# Patient Record
Sex: MALE | Race: WHITE
[De-identification: names, ages, dates, MRNs, and addresses within clinical notes are randomized per-mention and may not be internally consistent; named-entity substitution may affect disease eponyms.]

---

## 2019-05-23 PROBLEM — Z00.00 ENCOUNTER FOR PREVENTIVE HEALTH EXAMINATION: Status: ACTIVE | Noted: 2019-05-23

## 2019-09-03 ENCOUNTER — APPOINTMENT (OUTPATIENT)
Dept: GASTROENTEROLOGY | Facility: CLINIC | Age: 49
End: 2019-09-03
Payer: COMMERCIAL

## 2019-09-03 VITALS
DIASTOLIC BLOOD PRESSURE: 92 MMHG | HEART RATE: 55 BPM | HEIGHT: 70 IN | SYSTOLIC BLOOD PRESSURE: 148 MMHG | WEIGHT: 210 LBS | BODY MASS INDEX: 30.06 KG/M2

## 2019-09-03 DIAGNOSIS — Z82.49 FAMILY HISTORY OF ISCHEMIC HEART DISEASE AND OTHER DISEASES OF THE CIRCULATORY SYSTEM: ICD-10-CM

## 2019-09-03 DIAGNOSIS — Z78.9 OTHER SPECIFIED HEALTH STATUS: ICD-10-CM

## 2019-09-03 DIAGNOSIS — R55 SYNCOPE AND COLLAPSE: ICD-10-CM

## 2019-09-03 DIAGNOSIS — Z82.5 FAMILY HISTORY OF ASTHMA AND OTHER CHRONIC LOWER RESPIRATORY DISEASES: ICD-10-CM

## 2019-09-03 DIAGNOSIS — Z87.891 PERSONAL HISTORY OF NICOTINE DEPENDENCE: ICD-10-CM

## 2019-09-03 PROCEDURE — 36415 COLL VENOUS BLD VENIPUNCTURE: CPT

## 2019-09-03 PROCEDURE — 82274 ASSAY TEST FOR BLOOD FECAL: CPT | Mod: QW

## 2019-09-03 PROCEDURE — 99244 OFF/OP CNSLTJ NEW/EST MOD 40: CPT | Mod: 25

## 2019-09-03 NOTE — PHYSICAL EXAM
[General Appearance - Alert] : alert [General Appearance - In No Acute Distress] : in no acute distress [General Appearance - Well Nourished] : well nourished [General Appearance - Well Developed] : well developed [General Appearance - Well-Appearing] : healthy appearing [Sclera] : the sclera and conjunctiva were normal [PERRL With Normal Accommodation] : pupils were equal in size, round, and reactive to light [Extraocular Movements] : extraocular movements were intact [Strabismus] : no strabismus was seen [Optic Disc Abnormality] : the optic disc were normal in size and color [Retina] : no retinal hemorrhages, vessel changes or exudates seen on fundoscopic exam [Outer Ear] : the ears and nose were normal in appearance [Examination Of The Oral Cavity] : the lips and gums were normal [Both Tympanic Membranes Were Examined] : both tympanic membranes were normal [Nasal Cavity] : the nasal mucosa and septum were normal [Oropharynx] : the oropharynx was normal [Neck Appearance] : the appearance of the neck was normal [Neck Cervical Mass (___cm)] : no neck mass was observed [Jugular Venous Distention Increased] : there was no jugular-venous distention [Thyroid Diffuse Enlargement] : the thyroid was not enlarged [Thyroid Nodule] : there were no palpable thyroid nodules [Auscultation Breath Sounds / Voice Sounds] : lungs were clear to auscultation bilaterally [Lungs Percussion] : the lungs were normal to percussion [Heart Rate And Rhythm] : heart rate was normal and rhythm regular [Heart Sounds] : normal S1 and S2 [Heart Sounds Gallop] : no gallops [Heart Sounds Pericardial Friction Rub] : no pericardial rub [Murmurs] : no murmurs [Arterial Pulses Carotid] : carotid pulses were normal with no bruits [Abdominal Aorta] : the abdominal aorta was normal [Full Pulse] : the pedal pulses are present [Edema] : there was no peripheral edema [Abdomen Soft] : soft [Bowel Sounds] : normal bowel sounds [Abdomen Tenderness] : non-tender [Abdomen Mass (___ Cm)] : no abdominal mass palpated [Abdomen Hernia] : no hernia was discovered [Normal Sphincter Tone] : normal sphincter tone [No Rectal Mass] : no rectal mass [Scrotum] : the scrotum was normal [Penis Abnormality] : the penis was normal [Testes Swelling] : the testicles were not swollen [Prostate Enlargement] : the prostate was not enlarged [Testes Mass (___cm)] : there were no testicular masses [Prostate Tenderness] : the prostate was not tender [Cervical Lymph Nodes Enlarged Posterior Bilaterally] : posterior cervical [Cervical Lymph Nodes Enlarged Anterior Bilaterally] : anterior cervical [Supraclavicular Lymph Nodes Enlarged Bilaterally] : supraclavicular [Axillary Lymph Nodes Enlarged Bilaterally] : axillary [Inguinal Lymph Nodes Enlarged Bilaterally] : inguinal [Femoral Lymph Nodes Enlarged Bilaterally] : femoral [No CVA Tenderness] : no ~M costovertebral angle tenderness [No Spinal Tenderness] : no spinal tenderness [Abnormal Walk] : normal gait [Nail Clubbing] : no clubbing  or cyanosis of the fingernails [Musculoskeletal - Swelling] : no joint swelling seen [Motor Tone] : muscle strength and tone were normal [Involuntary Movements] : no involuntary movements were seen [Skin Color & Pigmentation] : normal skin color and pigmentation [Skin Turgor] : normal skin turgor [Skin Lesions] : no skin lesions [] : no rash [No Focal Deficits] : no focal deficits [Oriented To Time, Place, And Person] : oriented to person, place, and time [Affect] : the affect was normal [Impaired Insight] : insight and judgment were intact [Mood] : the mood was normal [Occult Blood Positive] : stool was negative for occult blood [FreeTextEntry1] : Brown stool, Hemoccult negative, iFOBT negative

## 2019-09-03 NOTE — HISTORY OF PRESENT ILLNESS
[FreeTextEntry1] : This 48-year-old gentleman is referred for an evaluation prior to scheduling a follow-up colonoscopy. I performed 3 colonoscopies on him in the past, the first one was about 18 years ago, at which time he had colonic polyps. The subsequent 2 colonoscopies were negative for polyps. He has a history of irritable bowel syndrome, and occasionally having 10 bowel movements a day. This happens rarely, and usually has multiple bowel movements in the morning. The first bowel movement is formed and subsequent bowel movements are less formed. He denies blood in the stool. He does complain of borborygmi. He was diagnosed with irritable bowel syndrome. When he bends forward to tie his shoelaces, he feels like he is going to pass out. There is no family history of colon polyps or colon cancer. He had chest pain and some shortness of breath. He was evaluated by a cardiologist, Dr. Gary and a coronary angiogram revealed some blockages not requiring stenting. He has hypertension, for which she takes atenolol 25 mg and hypercholesterolemia. He was on rosuvastatin in the past, but developed muscle aches and joint pains causing him to stop the medication. He retired from the police department in 2019, and since then, he has not been complaining of shortness of breath or chest pain. His mother  secondary to heart disease following heart valve surgery. His father  secondary to emphysema. One of his 2 brothers has emphysema and his other brother and sister are healthy. He is , and his son and daughter are healthy. He smoked when younger, but stopped smoking many years ago. He drinks alcohol frequently, especially on weekends. He denies becoming intoxicated. No drug allergies are known.

## 2019-09-03 NOTE — REASON FOR VISIT
[Consultation] : a consultation visit [FreeTextEntry1] : Pre- Colonoscopy exam.  Pt's last CO was about 10 years ago, which showed polyps and dx of IBS.  He intermittent c/o gurgling/growling in stomach.

## 2019-09-03 NOTE — CONSULT LETTER
[Consult Letter:] : I had the pleasure of evaluating your patient, [unfilled]. [Dear  ___] : Dear  [unfilled], [( Thank you for referring [unfilled] for consultation for _____ )] : Thank you for referring [unfilled] for consultation for [unfilled] [Please see my note below.] : Please see my note below. [Consult Closing:] : Thank you very much for allowing me to participate in the care of this patient.  If you have any questions, please do not hesitate to contact me. [Sincerely,] : Sincerely, [FreeTextEntry3] : Natalio Arevalo MD

## 2019-09-03 NOTE — ASSESSMENT
[FreeTextEntry1] : 1. History of colonic polyps-rule out metachronous polyps.\par 2. Hypertension.\par 3. Hypercholesterolemia.\par 4. Coronary artery disease.\par 5. Irritable bowel syndrome, diarrheal variant.\par 6. Suspect vasovagal symptoms.\par \par Plan:\par 1. The patient was advised to schedule a colonoscopy-procedure, material risks, benefits and alternatives were discussed with the patient at length. Brochure given.  MiraLax prep.\par 2. Routine bloods drawn.\par 3. The patient was advised to speak to his PCP and/or cardiologist regarding the suspected vasovagal symptoms.

## 2019-09-03 NOTE — REVIEW OF SYSTEMS
[Hoarseness] : hoarseness [Shortness Of Breath] : shortness of breath [Joint Pain] : joint pain [Joint Stiffness] : joint stiffness [Limb Pain] : limb pain [Dry Skin] : dry skin [Muscle Weakness] : muscle weakness [Easy Bruising] : a tendency for easy bruising [Negative] : Heme/Lymph

## 2019-09-04 ENCOUNTER — LABORATORY RESULT (OUTPATIENT)
Age: 49
End: 2019-09-04

## 2019-09-04 LAB
25(OH)D3 SERPL-MCNC: 33.8 NG/ML
ALBUMIN SERPL ELPH-MCNC: 4.9 G/DL
ALP BLD-CCNC: 83 U/L
ALT SERPL-CCNC: 27 U/L
ANION GAP SERPL CALC-SCNC: 14 MMOL/L
AST SERPL-CCNC: 19 U/L
BASOPHILS # BLD AUTO: 0.04 K/UL
BASOPHILS NFR BLD AUTO: 0.6 %
BILIRUB DIRECT SERPL-MCNC: 0.2 MG/DL
BILIRUB SERPL-MCNC: 0.7 MG/DL
BUN SERPL-MCNC: 18 MG/DL
CALCIUM SERPL-MCNC: 10.3 MG/DL
CHLORIDE SERPL-SCNC: 105 MMOL/L
CHOLEST SERPL-MCNC: 196 MG/DL
CHOLEST/HDLC SERPL: 4.4 RATIO
CO2 SERPL-SCNC: 25 MMOL/L
CREAT SERPL-MCNC: 0.94 MG/DL
EOSINOPHIL # BLD AUTO: 0.15 K/UL
EOSINOPHIL NFR BLD AUTO: 2.3 %
ESTIMATED AVERAGE GLUCOSE: 100 MG/DL
FERRITIN SERPL-MCNC: 496 NG/ML
GGT SERPL-CCNC: 16 U/L
GLUCOSE SERPL-MCNC: 100 MG/DL
HBA1C MFR BLD HPLC: 5.1 %
HCT VFR BLD CALC: 51.5 %
HDLC SERPL-MCNC: 45 MG/DL
HGB BLD-MCNC: 16.3 G/DL
IMM GRANULOCYTES NFR BLD AUTO: 0.6 %
IRON SATN MFR SERPL: 54 %
IRON SERPL-MCNC: 171 UG/DL
LDLC SERPL CALC-MCNC: 127 MG/DL
LYMPHOCYTES # BLD AUTO: 2.12 K/UL
LYMPHOCYTES NFR BLD AUTO: 32.5 %
MAGNESIUM SERPL-MCNC: 2.2 MG/DL
MAN DIFF?: NORMAL
MCHC RBC-ENTMCNC: 30.7 PG
MCHC RBC-ENTMCNC: 31.7 GM/DL
MCV RBC AUTO: 97 FL
MONOCYTES # BLD AUTO: 0.51 K/UL
MONOCYTES NFR BLD AUTO: 7.8 %
NEUTROPHILS # BLD AUTO: 3.67 K/UL
NEUTROPHILS NFR BLD AUTO: 56.2 %
PHOSPHATE SERPL-MCNC: 3.7 MG/DL
PLATELET # BLD AUTO: 216 K/UL
POTASSIUM SERPL-SCNC: 5.7 MMOL/L
PROT SERPL-MCNC: 6.8 G/DL
PSA SERPL-MCNC: 1.12 NG/ML
RBC # BLD: 5.31 M/UL
RBC # FLD: 13.3 %
SODIUM SERPL-SCNC: 144 MMOL/L
TIBC SERPL-MCNC: 315 UG/DL
TRIGL SERPL-MCNC: 121 MG/DL
UIBC SERPL-MCNC: 144 UG/DL
WBC # FLD AUTO: 6.53 K/UL

## 2019-09-24 ENCOUNTER — APPOINTMENT (OUTPATIENT)
Dept: GASTROENTEROLOGY | Facility: CLINIC | Age: 49
End: 2019-09-24
Payer: COMMERCIAL

## 2019-09-24 PROCEDURE — 45378 DIAGNOSTIC COLONOSCOPY: CPT

## 2021-08-10 ENCOUNTER — NON-APPOINTMENT (OUTPATIENT)
Age: 51
End: 2021-08-10

## 2021-08-11 ENCOUNTER — APPOINTMENT (OUTPATIENT)
Dept: ORTHOPEDIC SURGERY | Facility: CLINIC | Age: 51
End: 2021-08-11
Payer: COMMERCIAL

## 2021-08-11 ENCOUNTER — NON-APPOINTMENT (OUTPATIENT)
Age: 51
End: 2021-08-11

## 2021-08-11 PROCEDURE — 99203 OFFICE O/P NEW LOW 30 MIN: CPT | Mod: 25

## 2021-08-11 PROCEDURE — 20550 NJX 1 TENDON SHEATH/LIGAMENT: CPT | Mod: F8

## 2021-11-01 ENCOUNTER — APPOINTMENT (OUTPATIENT)
Dept: ORTHOPEDIC SURGERY | Facility: CLINIC | Age: 51
End: 2021-11-01
Payer: COMMERCIAL

## 2021-11-01 VITALS
BODY MASS INDEX: 30.78 KG/M2 | SYSTOLIC BLOOD PRESSURE: 151 MMHG | DIASTOLIC BLOOD PRESSURE: 94 MMHG | WEIGHT: 215 LBS | HEART RATE: 76 BPM | HEIGHT: 70 IN

## 2021-11-01 DIAGNOSIS — Z78.9 OTHER SPECIFIED HEALTH STATUS: ICD-10-CM

## 2021-11-01 PROCEDURE — 99214 OFFICE O/P EST MOD 30 MIN: CPT | Mod: 25

## 2021-11-01 PROCEDURE — 20550 NJX 1 TENDON SHEATH/LIGAMENT: CPT | Mod: F8

## 2021-11-01 NOTE — DISCUSSION/SUMMARY
[FreeTextEntry1] : The patient had 2-1/2 months relief after prior cortisone injection right ring trigger finger.\par Left ring trigger finger has not been symptomatic.\par The statistical chances of resolution versus recurrence, and the statistics regarding the possibility of additional injections  were discussed with the because patient had 8 months of triggering prior to first injection the statistical chance of resolution with today's injection is lower than it would be if duration of symptoms were less than 2 to 3 months.\par Left ring trigger finger is not notably symptomatic.\par Patient treated with cortisone injection in 2 phases for right ring trigger finger recurrence.\par No other active hand problem identified requiring treatment.\par The following post-injection instructions were given to the patient: The patient should be cautious in activities for two weeks and then increase to full activities.  Thereafter, the patient should return if symptoms continue, or if they guillermina and recur subsequently. If symptoms do not recur, the patient need not return and can be seen on an as needed basis. The patient has expressed understanding and acceptance of analysis, treatment, and recommendations.  All questions answered.\par

## 2021-11-01 NOTE — PROCEDURE
[] : The injection was done in 2 phases with the first phase being subcutaneous injection of lidocaine 1.5 cc subcutaneously as anesthetic. When adequate level of anesthesia was achieved, the Celestone injection was undertaken. [4th] : 4th finger

## 2021-11-01 NOTE — PHYSICAL EXAM
[de-identified] : Right hand:\par Ring finger A1 pulley tender \par Full composite fist right hand, ring finger locks and triggers into extension.\par There is no other A1 pulley tenderness or triggering in any other finger, right hand.\par No pertinent MP, PIP, or DIP joint contributory findings.\par \par Left hand\par Ring finger A1 pulley slightly tender\par Subtle nodularity in flexor tendon but no provocable triggering or active triggering.\par No active or provokable triggering\par There is no other A1 pulley tenderness or triggering in any other finger, left hand.\par \par Neurologic: Median, ulnar, and radial motor and sensory are intact.\par  Phalen's test with median nerve compression: negative. \par Skin: No cyanosis, clubbing, edema or rashes.\par Vascular: Radial pulses intact.\par Lymphatic: No streaking or epitrochlear adenopathy.\par The patient is awake, alert, and oriented. Affect appropriate. Cooperative.

## 2022-02-28 ENCOUNTER — NON-APPOINTMENT (OUTPATIENT)
Age: 52
End: 2022-02-28

## 2022-02-28 DIAGNOSIS — R19.7 DIARRHEA, UNSPECIFIED: ICD-10-CM

## 2022-03-01 ENCOUNTER — NON-APPOINTMENT (OUTPATIENT)
Age: 52
End: 2022-03-01

## 2022-03-08 ENCOUNTER — APPOINTMENT (OUTPATIENT)
Dept: GASTROENTEROLOGY | Facility: CLINIC | Age: 52
End: 2022-03-08
Payer: COMMERCIAL

## 2022-03-08 VITALS
HEART RATE: 72 BPM | HEIGHT: 70 IN | DIASTOLIC BLOOD PRESSURE: 87 MMHG | WEIGHT: 208 LBS | SYSTOLIC BLOOD PRESSURE: 121 MMHG | BODY MASS INDEX: 29.78 KG/M2

## 2022-03-08 DIAGNOSIS — Z12.11 ENCOUNTER FOR SCREENING FOR MALIGNANT NEOPLASM OF COLON: ICD-10-CM

## 2022-03-08 DIAGNOSIS — R93.5 ABNORMAL FINDINGS ON DIAGNOSTIC IMAGING OF OTHER ABDOMINAL REGIONS, INCLUDING RETROPERITONEUM: ICD-10-CM

## 2022-03-08 DIAGNOSIS — I10 ESSENTIAL (PRIMARY) HYPERTENSION: ICD-10-CM

## 2022-03-08 DIAGNOSIS — K44.9 DIAPHRAGMATIC HERNIA W/OUT OBSTRUCTION OR GANGRENE: ICD-10-CM

## 2022-03-08 DIAGNOSIS — Z87.19 PERSONAL HISTORY OF OTHER DISEASES OF THE DIGESTIVE SYSTEM: ICD-10-CM

## 2022-03-08 DIAGNOSIS — K58.0 IRRITABLE BOWEL SYNDROME WITH DIARRHEA: ICD-10-CM

## 2022-03-08 DIAGNOSIS — E78.00 PURE HYPERCHOLESTEROLEMIA, UNSPECIFIED: ICD-10-CM

## 2022-03-08 DIAGNOSIS — I25.10 ATHEROSCLEROTIC HEART DISEASE OF NATIVE CORONARY ARTERY W/OUT ANGINA PECTORIS: ICD-10-CM

## 2022-03-08 DIAGNOSIS — Z86.010 PERSONAL HISTORY OF COLONIC POLYPS: ICD-10-CM

## 2022-03-08 PROCEDURE — 99214 OFFICE O/P EST MOD 30 MIN: CPT

## 2022-03-08 PROCEDURE — 82274 ASSAY TEST FOR BLOOD FECAL: CPT | Mod: QW

## 2022-03-08 RX ORDER — HYOSCYAMINE SULFATE 0.12 MG/1
0.12 TABLET SUBLINGUAL 3 TIMES DAILY
Qty: 90 | Refills: 3 | Status: ACTIVE | COMMUNITY
Start: 2022-03-08 | End: 1900-01-01

## 2022-03-08 RX ORDER — ASPIRIN 81 MG/1
81 TABLET, DELAYED RELEASE ORAL DAILY
Refills: 0 | Status: ACTIVE | COMMUNITY
Start: 2022-03-08

## 2022-03-08 RX ORDER — ATENOLOL 25 MG/1
25 TABLET ORAL DAILY
Refills: 0 | Status: ACTIVE | COMMUNITY

## 2022-03-08 NOTE — PHYSICAL EXAM
[General Appearance - Alert] : alert [General Appearance - In No Acute Distress] : in no acute distress [General Appearance - Well Nourished] : well nourished [General Appearance - Well Developed] : well developed [General Appearance - Well-Appearing] : healthy appearing [Sclera] : the sclera and conjunctiva were normal [PERRL With Normal Accommodation] : pupils were equal in size, round, and reactive to light [Extraocular Movements] : extraocular movements were intact [Strabismus] : no strabismus was seen [Optic Disc Abnormality] : the optic disc were normal in size and color [Retina] : no retinal hemorrhages, vessel changes or exudates seen on fundoscopic exam [Outer Ear] : the ears and nose were normal in appearance [Examination Of The Oral Cavity] : the lips and gums were normal [Both Tympanic Membranes Were Examined] : both tympanic membranes were normal [Oropharynx] : the oropharynx was normal [Neck Appearance] : the appearance of the neck was normal [Neck Cervical Mass (___cm)] : no neck mass was observed [Jugular Venous Distention Increased] : there was no jugular-venous distention [Thyroid Diffuse Enlargement] : the thyroid was not enlarged [Thyroid Nodule] : there were no palpable thyroid nodules [Auscultation Breath Sounds / Voice Sounds] : lungs were clear to auscultation bilaterally [Lungs Percussion] : the lungs were normal to percussion [Heart Rate And Rhythm] : heart rate was normal and rhythm regular [Heart Sounds] : normal S1 and S2 [Heart Sounds Gallop] : no gallops [Murmurs] : no murmurs [Heart Sounds Pericardial Friction Rub] : no pericardial rub [Arterial Pulses Carotid] : carotid pulses were normal with no bruits [Abdominal Aorta] : the abdominal aorta was normal [Full Pulse] : the pedal pulses are present [Edema] : there was no peripheral edema [Veins - Varicosity Changes] : there were no varicosital changes [Bowel Sounds] : normal bowel sounds [Abdomen Soft] : soft [Abdomen Tenderness] : non-tender [Abdomen Mass (___ Cm)] : no abdominal mass palpated [Abdomen Hernia] : no hernia was discovered [Normal Sphincter Tone] : normal sphincter tone [No Rectal Mass] : no rectal mass [Occult Blood Positive] : stool was negative for occult blood [Penis Abnormality] : the penis was normal [Scrotum] : the scrotum was normal [Testes Swelling] : the testicles were not swollen [Testes Mass (___cm)] : there were no testicular masses [Prostate Enlargement] : the prostate was not enlarged [Prostate Tenderness] : the prostate was not tender [Cervical Lymph Nodes Enlarged Posterior Bilaterally] : posterior cervical [Cervical Lymph Nodes Enlarged Anterior Bilaterally] : anterior cervical [Supraclavicular Lymph Nodes Enlarged Bilaterally] : supraclavicular [Femoral Lymph Nodes Enlarged Bilaterally] : femoral [Inguinal Lymph Nodes Enlarged Bilaterally] : inguinal [No CVA Tenderness] : no ~M costovertebral angle tenderness [No Spinal Tenderness] : no spinal tenderness [Abnormal Walk] : normal gait [Nail Clubbing] : no clubbing  or cyanosis of the fingernails [Involuntary Movements] : no involuntary movements were seen [Musculoskeletal - Swelling] : no joint swelling seen [Motor Tone] : muscle strength and tone were normal [Skin Color & Pigmentation] : normal skin color and pigmentation [Skin Turgor] : normal skin turgor [] : no rash [Skin Lesions] : no skin lesions [No Focal Deficits] : no focal deficits [Oriented To Time, Place, And Person] : oriented to person, place, and time [Impaired Insight] : insight and judgment were intact [Affect] : the affect was normal [Mood] : the mood was normal [FreeTextEntry1] : Anxious

## 2022-03-08 NOTE — HISTORY OF PRESENT ILLNESS
[FreeTextEntry1] : Colten was very sick about a week ago, at which time he had severe epigastric pain, nausea, vomiting, diarrhea and fever.  He went to [facility], where a CT scan showed evidence for a diffuse colitis, and he was suspected as having infectious colitis.  He was treated with levofloxacin and ondansetron.  After discharge, he returned to the hospital with continued pain, and a repeat CT scan suggested the presence of an apple core lesion in the transverse colon.  Colonoscopy in 2019 only revealed diverticulosis and hemorrhoids.  Both CT scans were done without oral contrast.  The patient feels much better now, and his diarrhea has abated, and he no longer has fever, nausea or vomiting.  The second CT scan revealed a hiatal hernia with a knuckle of transverse colon and trapped in the hernia, and the patient saw Dr. Mookie Ferrara, who wanted to perform a hiatal hernia repair; however, a more pressing issue is to determine if there is a colon cancer present in the transverse colon, as suggested by the second CT scan.  The patient remains to be very anxious, and he is requesting a prescription for hyoscyamine sublingual 0.125 mg should he develop diarrhea again.

## 2022-03-08 NOTE — CONSULT LETTER
[Dear  ___] : Dear  [unfilled], [Consult Letter:] : I had the pleasure of evaluating your patient, [unfilled]. [( Thank you for referring [unfilled] for consultation for _____ )] : Thank you for referring [unfilled] for consultation for [unfilled] [Please see my note below.] : Please see my note below. [Consult Closing:] : Thank you very much for allowing me to participate in the care of this patient.  If you have any questions, please do not hesitate to contact me. [Sincerely,] : Sincerely, [FreeTextEntry3] : Natalio Arevalo MD

## 2022-03-08 NOTE — ASSESSMENT
[FreeTextEntry1] : Assessment:\par 1. History of colonic polyps and abnormal CT scan suggestive of an apple core lesion in the transverse colon-colonoscopy September 24, 2019 revealed a few diverticula in the descending colon and internal hemorrhoids-rule out colon lesion/polyp.\par 2. Hypertension.\par 3. Hypercholesterolemia.\par 4. Coronary artery disease.\par 5. Irritable bowel syndrome, diarrheal variant-status post recent gastroenteritis.\par 6. Suspect vasovagal symptoms.\par 7. Hiatal hernia with knuckle of unobstructed transverse colon trapped within it.\par \par Plan:\par 1.  The patient was advised to schedule a colonoscopy.  The procedure, material risks (including bleeding, perforation, anesthesia-related complications, rare complications, death and risk of missing a lesion), benefits (including finding a polyp, cancer, inflammatory bowel disease or other lesions) and alternatives (including stool testing and imaging) were discussed with the patient at length.  The [facility] Brochure was given. The MiraLax preparation was advised.\par 2.  The patient was advised to schedule an upper endoscopy.  The procedure, material risks (including bleeding, perforation, anesthesia-related complications, rare complications, death and risk of missing a lesion), benefits (including finding an ulcer, esophagitis,  infection, celiac disease, cancer or other lesions) and alternatives (including imaging and stool testing) were discussed with the patient at length.  The [facility] Brochure was given.\par 3.  Extensive review of patient's medical records from [facility] was performed.

## 2022-03-08 NOTE — REVIEW OF SYSTEMS
[Feeling Poorly] : feeling poorly [Recent Weight Loss (___ Lbs)] : recent [unfilled] ~Ulb weight loss [Abdominal Pain] : abdominal pain [Vomiting] : vomiting [Melena] : meleliseo [Negative] : Heme/Lymph

## 2022-03-09 ENCOUNTER — NON-APPOINTMENT (OUTPATIENT)
Age: 52
End: 2022-03-09

## 2022-03-10 LAB — SARS-COV-2 N GENE NPH QL NAA+PROBE: NOT DETECTED

## 2022-03-13 ENCOUNTER — LABORATORY RESULT (OUTPATIENT)
Age: 52
End: 2022-03-13

## 2022-03-14 ENCOUNTER — APPOINTMENT (OUTPATIENT)
Dept: GASTROENTEROLOGY | Facility: CLINIC | Age: 52
End: 2022-03-14
Payer: COMMERCIAL

## 2022-03-14 DIAGNOSIS — K29.90 GASTRODUODENITIS, UNSPECIFIED, W/OUT BLEEDING: ICD-10-CM

## 2022-03-14 PROCEDURE — 45380 COLONOSCOPY AND BIOPSY: CPT

## 2022-03-14 PROCEDURE — 43239 EGD BIOPSY SINGLE/MULTIPLE: CPT | Mod: 59

## 2022-03-14 RX ORDER — OMEPRAZOLE 20 MG/1
20 CAPSULE, DELAYED RELEASE ORAL
Qty: 90 | Refills: 3 | Status: ACTIVE | COMMUNITY
Start: 2022-03-14 | End: 1900-01-01

## 2022-03-17 ENCOUNTER — NON-APPOINTMENT (OUTPATIENT)
Age: 52
End: 2022-03-17

## 2022-05-24 ENCOUNTER — NON-APPOINTMENT (OUTPATIENT)
Age: 52
End: 2022-05-24

## 2022-08-29 ENCOUNTER — NON-APPOINTMENT (OUTPATIENT)
Age: 52
End: 2022-08-29

## 2022-09-01 ENCOUNTER — APPOINTMENT (OUTPATIENT)
Dept: ORTHOPEDIC SURGERY | Facility: CLINIC | Age: 52
End: 2022-09-01

## 2022-09-01 PROCEDURE — 20550 NJX 1 TENDON SHEATH/LIGAMENT: CPT | Mod: RT

## 2022-09-01 PROCEDURE — 73130 X-RAY EXAM OF HAND: CPT | Mod: RT

## 2022-09-01 PROCEDURE — 99214 OFFICE O/P EST MOD 30 MIN: CPT | Mod: 25

## 2022-09-01 NOTE — END OF VISIT
[FreeTextEntry3] : All medical record entries made by the Scribe were at my,  Dr. Vin Gallardo MD., direction and personally dictated by me on 09/01/2022. I have personally reviewed the chart and agree that the record accurately reflects my personal performance of the history, physical exam, assessment and plan.

## 2022-09-01 NOTE — PHYSICAL EXAM
[de-identified] : Patient is WDWN, alert, and in no acute distress. Breathing is unlabored. He is grossly oriented to person, place, and time.\par \par Right Hand:\par There is A1 pulley tenderness in the ring finger. Full arc of motion in the fingers, and all intrinsic and extrinsic hand muscles 5/5. No joint instability on provocative testing, sensation is intact to light touch, and no skin lesions or discoloration.  [de-identified] : AP, lateral and oblique views of the RIGHT hand were obtained today and revealed no abnormalities. No acute fracture. No dislocation. Cartilage spaces are maintained.

## 2022-09-01 NOTE — HISTORY OF PRESENT ILLNESS
[FreeTextEntry1] : 51 year old RHD retired male presents with right ring finger pain and triggering x 1 year. He has previously seen Dr. Fernandez 8/11/21 and had bilateral trigger ring finger injections with good relief. He complains of intermittent pain and locking of the right ring finger. He states the pain is sharp when he opens the finger up after it locks, notably at the DIP. Flexing the right ring finger can exacerbate the symptoms, such as when making a full fist. He denies numbness or tingling. Denies prior injury.

## 2022-09-01 NOTE — ADDENDUM
[FreeTextEntry1] : I, Radha Anderson wrote this note acting as a scribe for Dr. Vin Gallardo on Sep 01, 2022.

## 2022-09-01 NOTE — DISCUSSION/SUMMARY
[FreeTextEntry1] : The underlying pathophysiology was reviewed with the patient. XR films were reviewed with the patient. Discussed at length the nature of the patient’s condition. The right ring finger symptoms appear secondary to trigger finger.\par \par At this time, given the recurrence of his symptoms I have recommended a repeat cortisone injection to the flexor tendon sheath of the right ring finger.\par \par The patient wishes to proceed with a cortisone injection at this time. The skin was prepped with alcohol and sprayed with Ethyl Chloride. An injection of 0.5 cc 1% Lidocaine without epinephrine, 0.25 cc Kenalog 40mg, and 0.25 cc Dexamethasone was administered into the flexor tendon sheath of the RIGHT ring finger (2/3). The patient tolerated the procedure well. Apply ice. \par \par All questions answered, understanding verbalized. Patient in agreement with plan of care. Follow up as needed.

## 2022-12-12 ENCOUNTER — APPOINTMENT (OUTPATIENT)
Dept: ORTHOPEDIC SURGERY | Facility: CLINIC | Age: 52
End: 2022-12-12

## 2023-01-03 ENCOUNTER — APPOINTMENT (OUTPATIENT)
Dept: ORTHOPEDIC SURGERY | Facility: CLINIC | Age: 53
End: 2023-01-03
Payer: COMMERCIAL

## 2023-01-03 VITALS
HEIGHT: 70 IN | OXYGEN SATURATION: 97 % | DIASTOLIC BLOOD PRESSURE: 84 MMHG | BODY MASS INDEX: 30.06 KG/M2 | WEIGHT: 210 LBS | SYSTOLIC BLOOD PRESSURE: 159 MMHG | HEART RATE: 63 BPM

## 2023-01-03 PROCEDURE — 99214 OFFICE O/P EST MOD 30 MIN: CPT | Mod: 57

## 2023-01-19 ENCOUNTER — APPOINTMENT (OUTPATIENT)
Dept: ORTHOPEDIC SURGERY | Facility: CLINIC | Age: 53
End: 2023-01-19
Payer: COMMERCIAL

## 2023-01-19 PROCEDURE — 26055 INCISE FINGER TENDON SHEATH: CPT | Mod: F8

## 2023-01-30 ENCOUNTER — APPOINTMENT (OUTPATIENT)
Dept: ORTHOPEDIC SURGERY | Facility: CLINIC | Age: 53
End: 2023-01-30
Payer: COMMERCIAL

## 2023-01-30 PROCEDURE — 99024 POSTOP FOLLOW-UP VISIT: CPT

## 2023-01-30 RX ORDER — MELOXICAM 15 MG/1
15 TABLET ORAL
Qty: 30 | Refills: 2 | Status: ACTIVE | COMMUNITY
Start: 2023-01-30 | End: 1900-01-01

## 2023-03-02 ENCOUNTER — APPOINTMENT (OUTPATIENT)
Dept: ORTHOPEDIC SURGERY | Facility: CLINIC | Age: 53
End: 2023-03-02
Payer: COMMERCIAL

## 2023-03-02 DIAGNOSIS — M65.342 TRIGGER FINGER, LEFT RING FINGER: ICD-10-CM

## 2023-03-02 DIAGNOSIS — M65.341 TRIGGER FINGER, RIGHT RING FINGER: ICD-10-CM

## 2023-03-02 PROCEDURE — 99024 POSTOP FOLLOW-UP VISIT: CPT
